# Patient Record
Sex: FEMALE | Race: WHITE | Employment: UNEMPLOYED | URBAN - METROPOLITAN AREA
[De-identification: names, ages, dates, MRNs, and addresses within clinical notes are randomized per-mention and may not be internally consistent; named-entity substitution may affect disease eponyms.]

---

## 2023-11-17 ENCOUNTER — OFFICE VISIT (OUTPATIENT)
Dept: FAMILY MEDICINE CLINIC | Facility: CLINIC | Age: 33
End: 2023-11-17
Payer: MEDICARE

## 2023-11-17 VITALS
OXYGEN SATURATION: 98 % | DIASTOLIC BLOOD PRESSURE: 68 MMHG | SYSTOLIC BLOOD PRESSURE: 110 MMHG | WEIGHT: 102 LBS | TEMPERATURE: 97 F | RESPIRATION RATE: 21 BRPM | HEART RATE: 66 BPM

## 2023-11-17 DIAGNOSIS — R19.7 DIARRHEA, UNSPECIFIED TYPE: Primary | ICD-10-CM

## 2023-11-17 DIAGNOSIS — J32.9 CHRONIC SINUSITIS, UNSPECIFIED LOCATION: ICD-10-CM

## 2023-11-17 PROBLEM — N92.0 MENORRHAGIA: Status: ACTIVE | Noted: 2022-06-27

## 2023-11-17 LAB
ALBUMIN SERPL-MCNC: 3.6 G/DL (ref 3.4–5)
ALBUMIN/GLOB SERPL: 0.9 {RATIO} (ref 1–2)
ALP LIVER SERPL-CCNC: 95 U/L
ALT SERPL-CCNC: 16 U/L
ANION GAP SERPL CALC-SCNC: 3 MMOL/L (ref 0–18)
AST SERPL-CCNC: 11 U/L (ref 15–37)
BASOPHILS # BLD AUTO: 0.06 X10(3) UL (ref 0–0.2)
BASOPHILS NFR BLD AUTO: 0.7 %
BILIRUB SERPL-MCNC: 0.5 MG/DL (ref 0.1–2)
BUN BLD-MCNC: 16 MG/DL (ref 9–23)
CALCIUM BLD-MCNC: 9.4 MG/DL (ref 8.5–10.1)
CHLORIDE SERPL-SCNC: 109 MMOL/L (ref 98–112)
CO2 SERPL-SCNC: 24 MMOL/L (ref 21–32)
CREAT BLD-MCNC: 0.92 MG/DL
EGFRCR SERPLBLD CKD-EPI 2021: 85 ML/MIN/1.73M2 (ref 60–?)
EOSINOPHIL # BLD AUTO: 0.15 X10(3) UL (ref 0–0.7)
EOSINOPHIL NFR BLD AUTO: 1.8 %
ERYTHROCYTE [DISTWIDTH] IN BLOOD BY AUTOMATED COUNT: 13.5 %
FASTING STATUS PATIENT QL REPORTED: NO
GLOBULIN PLAS-MCNC: 4.2 G/DL (ref 2.8–4.4)
GLUCOSE BLD-MCNC: 89 MG/DL (ref 70–99)
HCT VFR BLD AUTO: 40 %
HGB BLD-MCNC: 13.1 G/DL
IMM GRANULOCYTES # BLD AUTO: 0.04 X10(3) UL (ref 0–1)
IMM GRANULOCYTES NFR BLD: 0.5 %
LYMPHOCYTES # BLD AUTO: 1.57 X10(3) UL (ref 1–4)
LYMPHOCYTES NFR BLD AUTO: 19 %
MAGNESIUM SERPL-MCNC: 2.2 MG/DL (ref 1.6–2.6)
MCH RBC QN AUTO: 27.7 PG (ref 26–34)
MCHC RBC AUTO-ENTMCNC: 32.8 G/DL (ref 31–37)
MCV RBC AUTO: 84.6 FL
MONOCYTES # BLD AUTO: 0.95 X10(3) UL (ref 0.1–1)
MONOCYTES NFR BLD AUTO: 11.5 %
NEUTROPHILS # BLD AUTO: 5.51 X10 (3) UL (ref 1.5–7.7)
NEUTROPHILS # BLD AUTO: 5.51 X10(3) UL (ref 1.5–7.7)
NEUTROPHILS NFR BLD AUTO: 66.5 %
OSMOLALITY SERPL CALC.SUM OF ELEC: 283 MOSM/KG (ref 275–295)
PLATELET # BLD AUTO: 243 10(3)UL (ref 150–450)
POTASSIUM SERPL-SCNC: 4.7 MMOL/L (ref 3.5–5.1)
PROT SERPL-MCNC: 7.8 G/DL (ref 6.4–8.2)
RBC # BLD AUTO: 4.73 X10(6)UL
SODIUM SERPL-SCNC: 136 MMOL/L (ref 136–145)
WBC # BLD AUTO: 8.3 X10(3) UL (ref 4–11)

## 2023-11-17 PROCEDURE — 99215 OFFICE O/P EST HI 40 MIN: CPT | Performed by: FAMILY MEDICINE

## 2023-11-17 PROCEDURE — 80053 COMPREHEN METABOLIC PANEL: CPT | Performed by: FAMILY MEDICINE

## 2023-11-17 PROCEDURE — 85025 COMPLETE CBC W/AUTO DIFF WBC: CPT | Performed by: FAMILY MEDICINE

## 2023-11-17 PROCEDURE — 90686 IIV4 VACC NO PRSV 0.5 ML IM: CPT | Performed by: FAMILY MEDICINE

## 2023-11-17 PROCEDURE — G0008 ADMIN INFLUENZA VIRUS VAC: HCPCS | Performed by: FAMILY MEDICINE

## 2023-11-17 PROCEDURE — 83735 ASSAY OF MAGNESIUM: CPT | Performed by: FAMILY MEDICINE

## 2023-11-17 RX ORDER — GARLIC EXTRACT 500 MG
1 CAPSULE ORAL DAILY
COMMUNITY

## 2023-11-17 RX ORDER — LOPERAMIDE HYDROCHLORIDE 2 MG/1
2 CAPSULE ORAL 4 TIMES DAILY PRN
COMMUNITY

## 2023-11-17 RX ORDER — AMOXICILLIN AND CLAVULANATE POTASSIUM 875; 125 MG/1; MG/1
1 TABLET, FILM COATED ORAL 2 TIMES DAILY
Qty: 14 TABLET | Refills: 0 | Status: SHIPPED | OUTPATIENT
Start: 2023-11-17 | End: 2023-11-24

## 2023-11-17 NOTE — PROGRESS NOTES
Patient came in for draw of ordered fasting labs. Patient drawn out of left AC, x 2 attempts and tolerated well. SST (mint green) and lavender tube drawn.

## 2023-11-21 ENCOUNTER — LAB ENCOUNTER (OUTPATIENT)
Dept: LAB | Age: 33
End: 2023-11-21
Attending: FAMILY MEDICINE
Payer: MEDICARE

## 2023-11-21 DIAGNOSIS — R19.7 DIARRHEA, UNSPECIFIED TYPE: ICD-10-CM

## 2023-11-21 LAB
CRYPTOSP AG STL QL IA: NEGATIVE
G LAMBLIA AG STL QL IA: NEGATIVE

## 2023-11-21 PROCEDURE — 87493 C DIFF AMPLIFIED PROBE: CPT

## 2023-11-21 PROCEDURE — 87427 SHIGA-LIKE TOXIN AG IA: CPT

## 2023-11-21 PROCEDURE — 87077 CULTURE AEROBIC IDENTIFY: CPT

## 2023-11-21 PROCEDURE — 87046 STOOL CULTR AEROBIC BACT EA: CPT

## 2023-11-21 PROCEDURE — 87045 FECES CULTURE AEROBIC BACT: CPT

## 2023-11-21 PROCEDURE — 87329 GIARDIA AG IA: CPT

## 2023-11-21 PROCEDURE — 87272 CRYPTOSPORIDIUM AG IF: CPT

## 2023-11-22 ENCOUNTER — TELEPHONE (OUTPATIENT)
Dept: FAMILY MEDICINE CLINIC | Facility: CLINIC | Age: 33
End: 2023-11-22

## 2023-11-22 LAB — C DIFF TOX B STL QL: NEGATIVE

## 2023-11-22 NOTE — TELEPHONE ENCOUNTER
----- Message from Quang Haynes DO sent at 11/22/2023 11:46 AM CST -----  Stool studies thus far negative/normal

## 2023-11-27 DIAGNOSIS — R19.7 DIARRHEA, UNSPECIFIED TYPE: Primary | ICD-10-CM

## 2024-03-28 ENCOUNTER — TELEPHONE (OUTPATIENT)
Dept: FAMILY MEDICINE CLINIC | Facility: CLINIC | Age: 34
End: 2024-03-28

## 2024-05-29 ENCOUNTER — TELEPHONE (OUTPATIENT)
Dept: FAMILY MEDICINE CLINIC | Facility: CLINIC | Age: 34
End: 2024-05-29

## 2024-05-29 NOTE — TELEPHONE ENCOUNTER
Called and LMVM on mothers phone to call office and set up appointment for annual wellness exam. Phone number was provided

## 2024-06-24 ENCOUNTER — TELEPHONE (OUTPATIENT)
Dept: FAMILY MEDICINE CLINIC | Facility: CLINIC | Age: 34
End: 2024-06-24

## 2024-06-24 NOTE — TELEPHONE ENCOUNTER
Please reach out to pt's mother-legal guardian and schedule a medicare AWE for pt.  She is overdue.

## 2024-07-05 ENCOUNTER — OFFICE VISIT (OUTPATIENT)
Dept: FAMILY MEDICINE CLINIC | Facility: CLINIC | Age: 34
End: 2024-07-05
Payer: MEDICARE

## 2024-07-05 VITALS
HEART RATE: 72 BPM | TEMPERATURE: 98 F | DIASTOLIC BLOOD PRESSURE: 80 MMHG | WEIGHT: 101.19 LBS | RESPIRATION RATE: 18 BRPM | BODY MASS INDEX: 20.67 KG/M2 | OXYGEN SATURATION: 100 % | SYSTOLIC BLOOD PRESSURE: 122 MMHG | HEIGHT: 58.58 IN

## 2024-07-05 DIAGNOSIS — R79.89 OTHER SPECIFIED ABNORMAL FINDINGS OF BLOOD CHEMISTRY: ICD-10-CM

## 2024-07-05 DIAGNOSIS — I51.7 CARDIOMEGALY: ICD-10-CM

## 2024-07-05 DIAGNOSIS — E03.9 HYPOTHYROIDISM, UNSPECIFIED TYPE: ICD-10-CM

## 2024-07-05 DIAGNOSIS — N92.4 EXCESSIVE BLEEDING IN PREMENOPAUSAL PERIOD: ICD-10-CM

## 2024-07-05 DIAGNOSIS — Q92.8: Chronic | ICD-10-CM

## 2024-07-05 DIAGNOSIS — Z00.00 ENCOUNTER FOR ANNUAL HEALTH EXAMINATION: Primary | ICD-10-CM

## 2024-07-05 DIAGNOSIS — D22.9 ATYPICAL NEVI: ICD-10-CM

## 2024-07-05 NOTE — PATIENT INSTRUCTIONS
Alma Dawson's SCREENING SCHEDULE   Tests on this list are recommended by your physician but may not be covered, or covered at this frequency, by your insurer.   Please check with your insurance carrier before scheduling to verify coverage.   PREVENTATIVE SERVICES FREQUENCY &  COVERAGE DETAILS LAST COMPLETION DATE   Diabetes Screening    Fasting Blood Sugar /  Glucose    One screening every 12 months if never tested or if previously tested but not diagnosed with pre-diabetes   One screening every 6 months if diagnosed with pre-diabetes Lab Results   Component Value Date    GLU 89 11/17/2023        Cardiovascular Disease Screening    Lipid Panel  Cholesterol  Lipoprotein (HDL)  Triglycerides Covered every 5 years for all Medicare beneficiaries without apparent signs or symptoms of cardiovascular disease No results found for: \"CHOLEST\", \"HDL\", \"LDL\", \"LDLD\", \"LDLC\", \"TRIG\"      Electrocardiogram (EKG)   Covered if needed at Welcome to Medicare, and non-screening if indicated for medical reasons -      Ultrasound Screening for Abdominal Aortic Aneurysm (AAA) Covered once in a lifetime for one of the following risk factors    Men who are 65-75 years old and have ever smoked    Anyone with a family history -     Colorectal Cancer Screening  Covered for ages 50-85; only need ONE of the following:    Colonoscopy   Covered every 10 years    Covered every 2 years if patient is at high risk or previous colonoscopy was abnormal -    No recommendations at this time    Flexible Sigmoidoscopy   Covered every 4 years -    Fecal Occult Blood Test Covered annually -   Bone Density Screening    Bone density screening    Covered every 2 years after age 65 if diagnosed with risk of osteoporosis or estrogen deficiency.    Covered yearly for long-term glucocorticoid medication use (Steroids) No results found for this or any previous visit.      No recommendations at this time   Pap and Pelvic    Pap   Covered every 2 years  for women at normal risk; Annually if at high risk -  Health Maintenance   Topic Date Due    Pap Smear  03/28/2019       Chlamydia Annually if high risk -  No recommendations at this time   Screening Mammogram    Mammogram     Recommend annually for all female patients aged 40 and older    One baseline mammogram covered for patients aged 35-39 -    No recommendations at this time    Immunizations    Influenza Covered once per flu season  Please get every year 11/17/2023  No recommendations at this time    Pneumococcal Each vaccine ([id number] & [id number]) covered once after 65 Prevnar 13: -    [id number]: -     No recommendations at this time    Hepatitis B One screening covered for patients with certain risk factors   -  No recommendations at this time    Tetanus Toxoid Not covered by Medicare Part B unless medically necessary (cut with metal); may be covered with your pharmacy prescription benefits -    Tetanus, Diptheria and Pertusis TD and TDaP Not covered by Medicare Part B -  No recommendations at this time    Zoster Not covered by Medicare Part B; may be covered with your pharmacy  prescription benefits -  No recommendations at this time        Labs when fasting 8 hrs or more   Pap deferred per gyne    F/u 1 yr next physical or sooner if needed

## 2024-07-05 NOTE — PROGRESS NOTES
Subjective:   Alma Dawson is a 33 year old female who presents for a Medicare Subsequent Annual Wellness visit (Pt already had Initial Annual Wellness).  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  Mother provides history.    Per mother patient does not need to get Pap smears through GYN as this is low yield according to mother from the gynecologist as patient has never been sexually active.  She has never had a Pap smear.    History/Other:   Fall Risk Assessment:   She has been screened for Falls and is low risk.      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 She has Meal Preparation difficulties based on screening of functional status.She has difficulties Managing Money/Bills based on screening of functional status.She has difficulties Shopping for Groceries based on screening of functional status. She has difficulties Taking Meds as Rx'd based on screening of functional status. She has Vision problems based on screening of functional status.   Patient has 6p partial trisomy syndrome.  Her mother is her guardian      Depression Screening (PHQ):  PHQ-2 SCORE: 0  , done 7/3/2024   Last [place] Suicide Screening on 7/5/2024 was No Risk.    Advanced Directives:   She does have a Living Will but we do NOT have it on file in  Epic.    She does have a POA but we do NOT have it on file in Epic.        Patient Active Problem List   Diagnosis    6P partial trisomy syndrome (HCC)    Cardiomegaly    Gastritis    Hypothyroid    Herlinda-Rios tear    Menorrhagia     Allergies:  She is allergic to azithromycin, cefaclor, erythromycin, latex, sulfa antibiotics, and theophylline.    Current Medications:  Outpatient Medications Marked as Taking for the 7/5/24 encounter (Office Visit) with Carlie Pittman DO   Medication Sig    Levonorgest-Eth Estrad 91-Day 0.15-0.03 MG Oral Tab Take 1 tablet by mouth daily.    Multiple Vitamin (MULTIVITAMIN ADULT OR) Take by mouth As Directed.    acidophilus-pectin Oral Cap Take 1 capsule by mouth daily.       Medical History:  She  has a past medical history of 6P partial trisomy syndrome (HCC) (03/28/2016), Cardiomegaly (09/26/2015), Gastritis (12/03/2014), Hypothyroid (09/30/2015), Herlinda-Rios tear (12/03/2014), and Menorrhagia (06/27/2022).  Surgical History:  She  has no past surgical history on file.   Family History:  Her family history includes No Known Problems in her maternal grandmother.  Social History:  She  reports that she has never smoked. She has never used smokeless tobacco. She reports that she does not drink alcohol and does not use drugs.    Tobacco:  She has never smoked tobacco.    CAGE Alcohol Screen:   CAGE screening score of 0 on 7/3/2024, showing low risk of alcohol abuse.      Patient Care Team:  Carlie Pittman DO as PCP - General (Family Medicine)    Review of Systems  See HPI    Objective:   Physical Exam  Constitutional:       Appearance: Normal appearance.   HENT:      Right Ear: Tympanic membrane and ear canal normal.      Left Ear: Tympanic membrane and ear canal normal.      Mouth/Throat:      Mouth: Mucous membranes are moist.      Pharynx: Oropharynx is clear.      Comments: High arched palate  Eyes:      Pupils: Pupils are equal, round, and reactive to light.      Comments:  glasses   Cardiovascular:      Rate and Rhythm: Normal rate and regular rhythm.      Pulses: Normal pulses.      Heart sounds: No murmur heard.     No friction rub. No gallop.   Pulmonary:      Effort: Pulmonary effort is normal. No respiratory distress.      Breath sounds: No wheezing, rhonchi or rales.   Abdominal:      General: Bowel sounds are normal. There is no distension.      Palpations: Abdomen is soft.      Tenderness: There is no abdominal tenderness.   Musculoskeletal:         General: No tenderness.      Cervical back: Neck supple.      Right lower leg: No edema.      Left lower leg: No edema.   Lymphadenopathy:      Cervical: No cervical adenopathy.   Skin:     General: Skin is warm.      Comments: Numerous atypical nevi noted on bilateral lower extremities with irregular borders and two tone in color   Neurological:      General: No focal deficit present.      Mental Status: She is alert.   Psychiatric:         Mood and Affect: Mood normal.         Speech: She is noncommunicative.         Behavior: Behavior normal.       /80 (BP Location: Right arm, Patient Position: Supine, Cuff Size: adult)   Pulse 72   Temp 98.2 °F (36.8 °C) (Temporal)   Resp 18   Ht 4' 10.58\" (1.488 m)   Wt 101 lb 3.2 oz (45.9 kg)   LMP 06/21/2024 (Approximate)   SpO2 100%   BMI 20.73 kg/m²  Estimated body mass index is 20.73 kg/m² as calculated from the following:    Height as of this encounter: 4' 10.58\" (1.488 m).    Weight as of this encounter: 101 lb 3.2 oz (45.9 kg).    Medicare Hearing Assessment:   Hearing Screening    Time taken: 7/5/2024  2:24 PM  Entry User: Carlie Pittman DO  Screening Method: Finger Rub  Finger Rub Result: Pass         Visual Acuity:   Right Eye Visual Acuity:  (pt refused can not see distance)     Left Eye Visual Acuity:  (pt refused can not see distance)     Both Eyes Visual Acuity:  (pt refused can not see distance)     Able To Tolerate Visual Acuity: No (patient is non verbal)    6p  partial trisomy syndrome-pt unable to do vision screen     Assessment & Plan:   Alma Dawson is a 33 year old female who presents for a Medicare Assessment.       ICD-10-CM    1. Encounter for annual health examination  Z00.00       2. 6P partial trisomy syndrome (HCC)  Q92.8 CMP     CBC With Differential With Platelet     TSH W Reflex To Free T4     Lipid Panel      3. Cardiomegaly  I51.7 CMP     TSH W Reflex To Free T4      4. Hypothyroidism, unspecified type  E03.9 CMP     TSH W Reflex To Free T4      5. Excessive bleeding in premenopausal period  N92.4 CMP     CBC With Differential With Platelet     Lipid Panel      6. Other specified abnormal findings of blood chemistry  R79.89 Lipid Panel      7. Atypical nevi  D22.9 Derm Referral - External        The patient indicates understanding of these issues and agrees to the plan.  Reinforced healthy diet, lifestyle, and exercise.  Labs-lipids, CMP, CBC, TSH  Pap deferred per gynecologist.  Recommend to discuss with GYN whether or not this is needed  Immunizations UTD  Needs follow-up with dermatology regarding multiple atypical nevi.  Gave referral to Dr. Carrasquillo  Follow-up 1 year for next Medicare AWE or sooner if needed      Carlie Pittman DO, 7/5/2024     Supplementary Documentation:   General Health:  In the past six months, have you lost more than 10 pounds without trying?: 2 - No  Has your appetite been poor?: No  Type of Diet: Balanced  How does the patient maintain a good energy level?: Daily Walks  How would you describe your daily physical activity?: Moderate  How would you describe your current health state?: Good  How do you maintain positive mental well-being?: Social Interaction;Visiting Friends;Visiting Family  On a scale of 0 to 10, with 0 being no pain and 10 being severe pain, what is your pain level?: 0 - (None)  In the past six months, have you experienced urine leakage?: 0-No  At any time do you feel concerned for the safety/well-being  of yourself and/or your children, in your home or elsewhere?: No  Have you had any immunizations at another office such as Influenza, Hepatitis B, Tetanus, or Pneumococcal?: No    Health Maintenance   Topic Date Due    Annual Physical  Never done    Pap Smear  03/28/2019    COVID-19 Vaccine (4 - 2023-24 season) 09/01/2023    Influenza Vaccine (1) 10/01/2024    DTaP,Tdap,and Td Vaccines (2 - Td or Tdap) 04/15/2030    Annual Depression Screening  Completed    Pneumococcal Vaccine: Birth to 64yrs  Aged Out

## 2024-08-16 ENCOUNTER — MED REC SCAN ONLY (OUTPATIENT)
Dept: FAMILY MEDICINE CLINIC | Facility: CLINIC | Age: 34
End: 2024-08-16

## 2024-11-05 DIAGNOSIS — N92.0 ENCOUNTER FOR MONITORING ORAL HORMONAL THERAPY FOR HEAVY MENSES: Primary | ICD-10-CM

## 2024-11-05 DIAGNOSIS — Z51.81 ENCOUNTER FOR MONITORING ORAL HORMONAL THERAPY FOR HEAVY MENSES: Primary | ICD-10-CM

## 2024-11-05 DIAGNOSIS — Z79.3 ENCOUNTER FOR MONITORING ORAL HORMONAL THERAPY FOR HEAVY MENSES: Primary | ICD-10-CM

## 2024-11-05 RX ORDER — LEVONORGESTREL AND ETHINYL ESTRADIOL 0.15-0.03
1 KIT ORAL DAILY
Qty: 91 TABLET | Refills: 0 | Status: CANCELLED | OUTPATIENT
Start: 2024-11-05

## 2024-11-08 NOTE — TELEPHONE ENCOUNTER
Pt requesting refill of   Requested Prescriptions     Pending Prescriptions Disp Refills    Levonorgest-Eth Estrad 91-Day 0.15-0.03 MG Oral Tab 91 tablet 0     Sig: Take 1 tablet by mouth daily.     Last Time Medication was Filled:  7/05/2024    Last Office Visit with Provider: 7/05/2024  Pap deferred per gynecologist. Recommend to discuss with GYN whether or not this is needed     Unable to approve refill, patient is overdue for pap smear         No future appointments.

## 2024-11-08 NOTE — TELEPHONE ENCOUNTER
This will need to go through her gynecologist as this was a previously discussed subject with them regarding refilling this medication without a pap smear on file for pt.

## 2024-11-13 NOTE — TELEPHONE ENCOUNTER
Pts mother called office asking to speak to a nurse in regards to this matter. Pts mother states that they have not received a call since 11/08/2024. Pts mother also states that pt does not get paps and pts Obgyn has left the practice where pt was seeing her and someone at Obgyn office told her that her PCP could refill it.

## 2024-11-13 NOTE — TELEPHONE ENCOUNTER
I called gyne on this.[phone]     Per care everywhere Dr Mckenzie with [facility] filled ocp for pt as she was to have an appt there 9/24    I s/w agnieszka RN there who informed me mom cancelled that appt the day before. They have not seen pt since 2022. She advises pt would not have been told to call \"pcp for refill\"    Bottom line, she needs appt with whomever will be managing. I was informed Dr Mckenzie is the only female provider left with their practice. (Pt previously saw Dr Tillman who has left their practice)    Tc to mom, on HIPAA, apologized in delay in getting in touch with her.    Discussed above.    She sts Alma is special needs, heavy periods and will not let anyone examine her. Saw gyne solenicolás for the heavy menses. Not sexually active. She declined seeing them since they won't be doing a gyne exam on her. They said since she is not allowing them to examine her and is not sexually active, maybe pcp would consider managing this for pt.    When discussing below, mom indicates no such discussion was had on this.    Asking you to reconsider in refilling for pt.    Last vs was July.    I pended this if you agree?    Please advise thx

## 2024-11-14 RX ORDER — LEVONORGESTREL AND ETHINYL ESTRADIOL 0.15-0.03
1 KIT ORAL DAILY
Qty: 91 TABLET | Refills: 0 | OUTPATIENT
Start: 2024-11-14

## 2024-11-14 NOTE — TELEPHONE ENCOUNTER
I completely understand pt's complex medical issues, but regardless this was something that pt's mother had discussed with her prior gynecologist and not me.  None of this was my medical agreement with mother/patient, it was a totally different specialty.  I am happy to refer her to [facility].  Please pend referral.

## 2024-11-14 NOTE — TELEPHONE ENCOUNTER
LMOM to return call to the office. Provided pt office phone [phone] along with office hours.

## 2024-11-15 RX ORDER — LEVONORGESTREL AND ETHINYL ESTRADIOL 0.15-0.03
1 KIT ORAL DAILY
Qty: 91 TABLET | Refills: 0 | Status: SHIPPED | OUTPATIENT
Start: 2024-11-15

## 2024-11-15 NOTE — TELEPHONE ENCOUNTER
This will be the only refill from our office.  She needs to contact gyne today to make appt.  After this 3 month supply, further will need to come from gyne

## 2024-11-15 NOTE — TELEPHONE ENCOUNTER
Spoke with Dr Pittman who agrees to provide short term OCP refill x 1-2 months to bridge patient until she is able to see a gynecologist. Pap and/or ongoing management of OCP must be provided by gynecology.     Mom provided with contact information for gynecologists:     MD Kalpana Ruzi MD Astrid Marshall, MD    [phone]    [address] [place] [address] , [address]  [place] [address]    Mom requesting short term refill to last until OBGYN appt. Mom aware that further refills will need to come from the gynecologist. Mom agreeable to this plan.     Request to Dr. Pittman for review

## 2025-01-02 ENCOUNTER — TELEPHONE (OUTPATIENT)
Dept: FAMILY MEDICINE CLINIC | Facility: CLINIC | Age: 35
End: 2025-01-02

## 2025-01-02 NOTE — TELEPHONE ENCOUNTER
Mom states Pt has been having episodes of out of control behaviors x 1 month and really bad today- throwing things, breaking things, hitting mom, bang heading, mom unable to calm Pt.  Pt has trisomy syndrome, non verbal  Mom crying and scared. Advised mom to call 911. Advised mom that I will call 911- mom refused , Mom states she will call 911

## 2025-01-13 ENCOUNTER — TELEPHONE (OUTPATIENT)
Dept: FAMILY MEDICINE CLINIC | Facility: CLINIC | Age: 35
End: 2025-01-13

## 2025-01-13 NOTE — TELEPHONE ENCOUNTER
[facility] [place]   ?  PARKING:   [employer] [facility]. [facility]: Parking Garage [address] on [address] and [address]. Pedestrian Walkway bridge is located on the 2nd floor of Parking Garage D.   .   GENERAL INSTRUCTIONS:  • One family members/friend who is over the age of 18 may accompany you. A responsible person MUST accompany you home and stay with you the first 24 hours after surgery. Your surgery will be cancelled if these arrangements have not been made.   • If you become ill prior to surgery (I.e. fever, vomiting), please notify your surgeon immediately.   • Children under 12 years old MUST have a parent with them at all times.   • On the day of surgery: Do not wear contact lenses, make up, nail polish or jewelry. Leave all valuables at home except what you will need or are instructed to bring.   • For your convenience, [facility] pharmacy is available to fill medications. Please bring a form of payments accepted at [facility]' locations.   BRING WITH YOU ON DAY OF SURGERY:  1. Insurance Card and referral (if required), 's license or photo ID  2. All your medications in their original pharmacy bottles  3. Advance directive (living will, Healthcare Power of )  4. All information on your pacemaker or AICD, if appropriate  5. Any other forms, x-rays or films the doctor may have given you  6. Any medical devices (I.e. crutches, braces, sling)  7. Loose fitting clothing, slippers, walking shoes if applicable  8. For comfort measures, you may bring headphones/music device/magazines that can be given to family when you go into the operating room      [facility] patients please check in with  Desk right off the 2nd floor Pedestrian walkway entrance.   ?  Acceptable clear liquids such as water, pre-surgical ensure/propel, non-colored clear gatorade, apple juice without fiber, pedialyte, 7-up/Sprite, black coffee or tea without milk products or lemon - can be given  Patient mom calling to see if doctor will fill a prescription enough until see can get to the Psychiatrist.  Patient was in the ER on 1/2/825 they prescribe Xanax and seems to be working   1 hour prior to arrival.  ?  Do NOT eat or consume dairy after midnight. This includes candy or gum.  ?    Please call the location of surgery with questions: [facility] (up to 6 PM) [phone]; Hospital (up to 7 PM) [phone].   After hours for both locations: [phone]  ?  This information has been reviewed with the patient  on 11/04/20 1:56 PM.

## 2025-01-16 PROBLEM — R46.89 AGGRESSIVE BEHAVIOR: Status: ACTIVE | Noted: 2025-01-02

## 2025-07-14 ENCOUNTER — OFFICE VISIT (OUTPATIENT)
Dept: FAMILY MEDICINE CLINIC | Facility: CLINIC | Age: 35
End: 2025-07-14
Payer: MEDICARE

## 2025-07-14 VITALS
WEIGHT: 112 LBS | BODY MASS INDEX: 22.58 KG/M2 | HEIGHT: 59 IN | HEART RATE: 75 BPM | DIASTOLIC BLOOD PRESSURE: 70 MMHG | TEMPERATURE: 97 F | SYSTOLIC BLOOD PRESSURE: 110 MMHG | RESPIRATION RATE: 20 BRPM | OXYGEN SATURATION: 98 %

## 2025-07-14 DIAGNOSIS — Z13.29 SCREENING FOR ENDOCRINE, NUTRITIONAL, METABOLIC AND IMMUNITY DISORDER: ICD-10-CM

## 2025-07-14 DIAGNOSIS — Z13.21 SCREENING FOR ENDOCRINE, NUTRITIONAL, METABOLIC AND IMMUNITY DISORDER: ICD-10-CM

## 2025-07-14 DIAGNOSIS — Z13.228 SCREENING FOR ENDOCRINE, NUTRITIONAL, METABOLIC AND IMMUNITY DISORDER: ICD-10-CM

## 2025-07-14 DIAGNOSIS — E03.9 HYPOTHYROIDISM, UNSPECIFIED TYPE: ICD-10-CM

## 2025-07-14 DIAGNOSIS — Z13.0 SCREENING FOR ENDOCRINE, NUTRITIONAL, METABOLIC AND IMMUNITY DISORDER: ICD-10-CM

## 2025-07-14 DIAGNOSIS — Z00.00 ENCOUNTER FOR ANNUAL HEALTH EXAMINATION: Primary | ICD-10-CM

## 2025-07-14 DIAGNOSIS — Z12.4 SCREENING FOR CERVICAL CANCER: ICD-10-CM

## 2025-07-14 DIAGNOSIS — Z13.6 SCREENING FOR ISCHEMIC HEART DISEASE: ICD-10-CM

## 2025-07-14 NOTE — PROGRESS NOTES
Subjective:   Alma Dawson is a 34 year old female who presents for a Subsequent Annual Wellness visit (Pt already had Initial Annual Wellness)   History of Present Illness  Alma Dawson is a 34 year old female who presents for routine follow-up and blood work. She is accompanied by her mother, who is also her power of  and legal guardian.  Pt is a full code.      Mother mentions weight fluctuations, which could be related to her thyroid or medications.  Hasn't had fasting labs in over a year.     She recently underwent a Pap smear, which was completed successfully despite being unpleasant. Saw mey Mercado.    She has a history of dermatological issues, having had ten biopsies taken during her last dermatology visit. Some biopsies returned questionable results, but it was believed that all concerning areas were removed. She is scheduled for a full body scan at the dermatologist's office at the end of the month. She experienced a staph infection at one of the biopsy sites, which became swollen, red, and painful two days post-procedure, requiring a prolonged healing period.    No changes in vision or prescription. No changes in moles in terms of shape, size, or color.      History/Other:   Fall Risk Assessment:   She has been screened for Falls and is low risk.      Cognitive Assessment:   Unable to assess-pt non verbal    Functional Ability/Status:   Alma Dawson has some abnormal functions as listed below:  She has Driving difficulties based on screening of functional status. She has Meal Preparation difficulties based on screening of functional status.She has difficulties Managing Money/Bills based on screening of functional status.She has difficulties Shopping for Groceries based on screening of functional status. She has difficulties Taking Meds as Rx'd based on screening of functional status. She has Vision problems based on screening of functional status.        Depression Screening (PHQ):      Advanced Directives:   She does have a Living Will but we do NOT have it on file in Epic.    She does have a POA but we do NOT have it on file in Epic.    Discussed Advance Care Planning with patient (and family/surrogate if present). Standard forms made available to patient in After Visit Summary.      Patient Active Problem List   Diagnosis    6P partial trisomy syndrome (HCC)    Cardiomegaly    Gastritis    Hypothyroid    Herlinda-Rios tear    Menorrhagia    Aggressive behavior     Allergies:  She is allergic to azithromycin, cefaclor, erythromycin, latex, sulfa antibiotics, and theophylline.    Current Medications:  Outpatient Medications Marked as Taking for the 7/14/25 encounter (Office Visit) with Carlie Pittman, DO   Medication Sig    escitalopram 10 MG Oral Tab Take 1 tablet (10 mg total) by mouth nightly.    hydrOXYzine 10 MG Oral Tab Take 1 tablet (10 mg total) by mouth. (Patient taking differently: Take 2 tablets (20 mg total) by mouth as needed for Anxiety.)    Levonorgest-Eth Estrad 91-Day 0.15-0.03 MG Oral Tab Take 1 tablet by mouth daily.    Multiple Vitamin (MULTIVITAMIN ADULT OR) Take by mouth As Directed.       Medical History:  She  has a past medical history of 6P partial trisomy syndrome (HCC) (03/28/2016), Aggressive behavior (01/02/2025), Allergic rhinitis, Cardiomegaly (09/26/2015), Gastritis (12/03/2014), Hypothyroid (09/30/2015), Herlinda-Rios tear (12/03/2014), and Menorrhagia (06/27/2022).  Surgical History:  She  has a past surgical history that includes other surgical history and myringotomy, laser-assisted.   Family History:  Her family history includes Depression in her mother; No Known Problems in her maternal grandmother.  Social History:  She  reports that she has never smoked. She has never been exposed to tobacco smoke. She has never used smokeless tobacco. She reports that she does not drink alcohol and does not use drugs.    Tobacco:  She  has never smoked tobacco.    CAGE Alcohol Screen:   She has been screened for alcohol abuse and her score is not 0:  Cut: Have you ever felt you should Cut down on your drinking?: (Patient-Rptd) Yes  Annoyed: Have people Annoyed you by criticizing your drinking?: (Patient-Rptd) Yes  Guilty: Have you ever felt bad or Guilty about your drinking?: (Patient-Rptd) Yes  Eye Opener: Have you ever had a drink first thing in the morning to steady your nerves or to get rid of a hangover (Eye opener)?: (Patient-Rptd) Yes  Total Score: (Patient-Rptd) 4      Patient Care Team:  Carlie Pittman DO as PCP - General (Family Medicine)    Review of Systems   See HPI    Objective:   Physical Exam  Constitutional:       General: She is awake.      Appearance: Normal appearance. She is well-developed.      Comments: Non verbal    HENT:      Right Ear: Tympanic membrane and ear canal normal.      Left Ear: Tympanic membrane and ear canal normal.      Mouth/Throat:      Mouth: Mucous membranes are moist.      Pharynx: Oropharynx is clear.   Eyes:      Pupils: Pupils are equal, round, and reactive to light.   Cardiovascular:      Rate and Rhythm: Normal rate and regular rhythm.      Pulses: Normal pulses.      Heart sounds: No murmur heard.     No friction rub. No gallop.   Pulmonary:      Effort: Pulmonary effort is normal. No respiratory distress.      Breath sounds: No wheezing, rhonchi or rales.   Abdominal:      General: Bowel sounds are normal. There is no distension.      Palpations: Abdomen is soft.      Tenderness: There is no abdominal tenderness.   Musculoskeletal:         General: No tenderness.      Cervical back: Neck supple.      Right lower leg: No edema.      Left lower leg: No edema.   Lymphadenopathy:      Cervical: No cervical adenopathy.   Skin:     General: Skin is warm.   Neurological:      General: No focal deficit present.      Mental Status: She is alert.   Psychiatric:         Mood and Affect: Mood normal.          Speech: She is noncommunicative.         Behavior: Behavior normal. Behavior is cooperative.         /70 (BP Location: Left arm, Patient Position: Sitting, Cuff Size: adult)   Pulse 75   Temp 97.4 °F (36.3 °C) (Temporal)   Resp 20   Ht 4' 11\" (1.499 m)   Wt 112 lb (50.8 kg)   LMP  (LMP Unknown)   SpO2 98%   BMI 22.62 kg/m²  Estimated body mass index is 22.62 kg/m² as calculated from the following:    Height as of this encounter: 4' 11\" (1.499 m).    Weight as of this encounter: 112 lb (50.8 kg).    Medicare Hearing Assessment:   Hearing Screening    Time taken: 7/14/2025  2:46 PM  Entry User: Idalmis Rowell CMA  Screening Method: Finger Rub  Finger Rub Result: Pass           Assessment & Plan:   Alma Dawson is a 34 year old female who presents for a Medicare Assessment.     1. Encounter for annual health examination (Primary)  Assessment & Plan  General Health Maintenance  - Order fasting blood work to check, CMP, CBC, TSH  - Schedule dermatology follow-up for full body scan on July 30, 2025  - Continue routine Pap smears and breast exams as per gyne  - Immunizations UTD        The patient and provider have a longitudinal relationship to address/treat the serious or complex condition as stated in this encounter.      Recording duration: 7 minutes    The patient indicates understanding of these issues and agrees to the plan.  Reinforced healthy diet, lifestyle, and exercise.      Return in 1 year (on 7/14/2026).     Carlie Pittman DO, 7/14/2025     Supplementary Documentation:   General Health:  In the past six months, have you lost more than 10 pounds without trying?: (Patient-Rptd) 2 - No  Has your appetite been poor?: (Patient-Rptd) No  Type of Diet: (Patient-Rptd) Balanced  How does the patient maintain a good energy level?: (Patient-Rptd) Daily Walks  How would you describe your daily physical activity?: (Patient-Rptd) Light  How would you describe your current health state?:  (Patient-Rptd) Good  How do you maintain positive mental well-being?: (Patient-Rptd) Social Interaction, Games, Visiting Family  On a scale of 0 to 10, with 0 being no pain and 10 being severe pain, what is your pain level?: (Patient-Rptd) 0 - (None)  In the past six months, have you experienced urine leakage?: (Patient-Rptd) 0-No  At any time do you feel concerned for the safety/well-being of yourself and/or your children, in your home or elsewhere?: (Patient-Rptd) No  Have you had any immunizations at another office such as Influenza, Hepatitis B, Tetanus, or Pneumococcal?: (Patient-Rptd) No    Health Maintenance   Topic Date Due    Pap Smear  03/28/2019    COVID-19 Vaccine (4 - 2024-25 season) 09/01/2024    Annual Physical  07/05/2025    Influenza Vaccine (1) 10/01/2025    DTaP,Tdap,and Td Vaccines (2 - Td or Tdap) 04/15/2030    Annual Depression Screening  Completed    Pneumococcal Vaccine: Birth to 50yrs  Aged Out    Meningococcal B Vaccine  Aged Out

## 2025-07-14 NOTE — PROGRESS NOTES
The following individual(s) verbally consented to be recorded using ambient AI listening technology and understand that they can each withdraw their consent to this listening technology at any point by asking the clinician to turn off or pause the recording:    Patient name: Alma Dawson  Additional names:  Emely Basilio/ mother

## 2025-07-25 LAB
ABSOLUTE BASOPHILS: 39 CELLS/UL (ref 0–200)
ABSOLUTE EOSINOPHILS: 208 CELLS/UL (ref 15–500)
ABSOLUTE LYMPHOCYTES: 1940 CELLS/UL (ref 850–3900)
ABSOLUTE MONOCYTES: 832 CELLS/UL (ref 200–950)
ABSOLUTE NEUTROPHILS: 4682 CELLS/UL (ref 1500–7800)
ALBUMIN/GLOBULIN RATIO: 1.4 (CALC) (ref 1–2.5)
ALBUMIN: 4.2 G/DL (ref 3.6–5.1)
ALKALINE PHOSPHATASE: 73 U/L (ref 31–125)
ALT: 11 U/L (ref 6–29)
AST: 18 U/L (ref 10–30)
BASOPHILS: 0.5 %
BILIRUBIN, TOTAL: 0.4 MG/DL (ref 0.2–1.2)
BUN: 17 MG/DL (ref 7–25)
CALCIUM: 9.7 MG/DL (ref 8.6–10.2)
CARBON DIOXIDE: 23 MMOL/L (ref 20–32)
CHLORIDE: 103 MMOL/L (ref 98–110)
CHOL/HDLC RATIO: 5.1 (CALC)
CHOLESTEROL, TOTAL: 220 MG/DL
CREATININE: 0.85 MG/DL (ref 0.5–0.97)
EGFR: 93 ML/MIN/1.73M2
EOSINOPHILS: 2.7 %
GLOBULIN: 2.9 G/DL (CALC) (ref 1.9–3.7)
GLUCOSE: 81 MG/DL (ref 65–99)
HDL CHOLESTEROL: 43 MG/DL
HEMATOCRIT: 41.6 % (ref 35–45)
HEMOGLOBIN: 13.4 G/DL (ref 11.7–15.5)
LDL-CHOLESTEROL: 157 MG/DL (CALC)
LYMPHOCYTES: 25.2 %
MCH: 28.6 PG (ref 27–33)
MCHC: 32.2 G/DL (ref 32–36)
MCV: 88.7 FL (ref 80–100)
MONOCYTES: 10.8 %
MPV: 10.8 FL (ref 7.5–12.5)
NEUTROPHILS: 60.8 %
NON-HDL CHOLESTEROL: 177 MG/DL (CALC)
PLATELET COUNT: 310 THOUSAND/UL (ref 140–400)
POTASSIUM: 4.3 MMOL/L (ref 3.5–5.3)
PROTEIN, TOTAL: 7.1 G/DL (ref 6.1–8.1)
RDW: 12.8 % (ref 11–15)
RED BLOOD CELL COUNT: 4.69 MILLION/UL (ref 3.8–5.1)
SODIUM: 136 MMOL/L (ref 135–146)
TRIGLYCERIDES: 90 MG/DL
TSH W/REFLEX TO FT4: 1.48 MIU/L
WHITE BLOOD CELL COUNT: 7.7 THOUSAND/UL (ref 3.8–10.8)